# Patient Record
Sex: MALE | Race: WHITE | URBAN - METROPOLITAN AREA
[De-identification: names, ages, dates, MRNs, and addresses within clinical notes are randomized per-mention and may not be internally consistent; named-entity substitution may affect disease eponyms.]

---

## 2020-11-09 ENCOUNTER — OFFICE VISIT (OUTPATIENT)
Dept: URBAN - METROPOLITAN AREA CLINIC 19 | Facility: CLINIC | Age: 70
End: 2020-11-09

## 2020-11-10 ENCOUNTER — OFFICE VISIT (OUTPATIENT)
Dept: URBAN - METROPOLITAN AREA CLINIC 19 | Facility: CLINIC | Age: 70
End: 2020-11-10

## 2020-11-17 ENCOUNTER — OFFICE VISIT (OUTPATIENT)
Dept: URBAN - METROPOLITAN AREA CLINIC 19 | Facility: CLINIC | Age: 70
End: 2020-11-17
Payer: MEDICARE

## 2020-11-17 ENCOUNTER — WEB ENCOUNTER (OUTPATIENT)
Dept: URBAN - METROPOLITAN AREA CLINIC 19 | Facility: CLINIC | Age: 70
End: 2020-11-17

## 2020-11-17 DIAGNOSIS — R19.7 DIARRHEA: ICD-10-CM

## 2020-11-17 DIAGNOSIS — K59.03 DRUG-INDUCED CONSTIPATION: ICD-10-CM

## 2020-11-17 DIAGNOSIS — K86.89 PANCREATIC INSUFFICIENCY: ICD-10-CM

## 2020-11-17 PROBLEM — 21782001: Status: ACTIVE | Noted: 2020-11-17

## 2020-11-17 PROCEDURE — G8483 FLU IMM NO ADMIN DOC REA: HCPCS | Performed by: INTERNAL MEDICINE

## 2020-11-17 PROCEDURE — 99213 OFFICE O/P EST LOW 20 MIN: CPT | Performed by: INTERNAL MEDICINE

## 2020-11-17 PROCEDURE — 3017F COLORECTAL CA SCREEN DOC REV: CPT | Performed by: INTERNAL MEDICINE

## 2020-11-17 PROCEDURE — G8420 CALC BMI NORM PARAMETERS: HCPCS | Performed by: INTERNAL MEDICINE

## 2020-11-17 PROCEDURE — G8427 DOCREV CUR MEDS BY ELIG CLIN: HCPCS | Performed by: INTERNAL MEDICINE

## 2020-11-17 PROCEDURE — 1036F TOBACCO NON-USER: CPT | Performed by: INTERNAL MEDICINE

## 2020-11-17 PROCEDURE — G9903 PT SCRN TBCO ID AS NON USER: HCPCS | Performed by: INTERNAL MEDICINE

## 2020-11-17 RX ORDER — EZETIMIBE 10 MG/1
TAKE 1 TABLET (10 MG) BY ORAL ROUTE ONCE DAILY TABLET ORAL 1
Qty: 0 | Refills: 0 | Status: ACTIVE | COMMUNITY
Start: 1900-01-01

## 2020-11-17 RX ORDER — ASPIRIN 81 MG/1
TABLET, COATED ORAL
Qty: 0 | Refills: 0 | Status: ACTIVE | COMMUNITY
Start: 1900-01-01

## 2020-11-17 RX ORDER — LANSOPRAZOLE 30 MG/1
CAPSULE, DELAYED RELEASE ORAL
Qty: 0 | Refills: 0 | Status: ACTIVE | COMMUNITY
Start: 1900-01-01

## 2020-11-17 NOTE — HPI-TODAY'S VISIT:
1/15/19 (Dr. Naren Tabares):  This is a follow-up appointment for this patient, a 68 year old /White male, after a previous visit on 05/07/2019, for an evaluation for diarrhea. The patient now reports that the diarrhea has significantly improved. The patient has no other significant complaints. He has not traveled out of the country and has not been exposed to contaminated water. The patient does not consume significant amounts of lactose containing products.  Pt is using a Pancreatic enyme formula made by [employer] It is working well It is significantly cheaper Creon is very expensive for him.   5/7/19: Patient presents for the first time today to my clinic for reevaluation of his EPI. He is doing well on his supplement. His main concern is whether he may have pancreatic cancer. He stopped drinking alcohol back in December - he was a heavy drinker (more than 1 serving daily). He also had negative testing for SIBO, lactose intolerance, and sucrose intolerance.   11/8/19: Patient presents for reevaluation of his EPI. 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 Continues to take Creon. Found that stopping artificial sweetners helped his diarrhea/fecal soiling more than anything. Will consider rechecking fecal elastase to see if he truly has EPI. Continues to avoid alcohol.  11/17/20:  Patient presents for reevaluation of his chronic pancreatitis and possible EPI.  Still on pancreatic enzyme supplements and doing well.  Uses one Creon with dinner due to cost and uses his OTC pancreatic enzymes for breakfast and lunch.  Had recent bilateral knee replacements - was getting some constipation with lower abdominal cramping.  MiraLax helps a little.

## 2021-03-02 ENCOUNTER — OFFICE VISIT (OUTPATIENT)
Dept: URBAN - METROPOLITAN AREA CLINIC 19 | Facility: CLINIC | Age: 71
End: 2021-03-02
Payer: MEDICARE

## 2021-03-02 VITALS
BODY MASS INDEX: 26.71 KG/M2 | HEART RATE: 60 BPM | HEIGHT: 70 IN | SYSTOLIC BLOOD PRESSURE: 131 MMHG | WEIGHT: 186.6 LBS | DIASTOLIC BLOOD PRESSURE: 78 MMHG | TEMPERATURE: 96.9 F

## 2021-03-02 DIAGNOSIS — Z86.010 HISTORY OF COLON POLYPS: ICD-10-CM

## 2021-03-02 DIAGNOSIS — K86.89 PANCREATIC INSUFFICIENCY: ICD-10-CM

## 2021-03-02 PROBLEM — 428283002: Status: ACTIVE | Noted: 2021-03-02

## 2021-03-02 PROCEDURE — 99213 OFFICE O/P EST LOW 20 MIN: CPT | Performed by: INTERNAL MEDICINE

## 2021-03-02 RX ORDER — SODIUM, POTASSIUM,MAG SULFATES 17.5-3.13G
254 ML SOLUTION, RECONSTITUTED, ORAL ORAL ONCE
Qty: 1 KIT | Refills: 0 | OUTPATIENT
Start: 2021-03-02 | End: 2021-03-03

## 2021-03-02 RX ORDER — ASPIRIN 81 MG/1
TABLET, COATED ORAL
Qty: 0 | Refills: 0 | Status: ACTIVE | COMMUNITY
Start: 1900-01-01

## 2021-03-02 RX ORDER — LANSOPRAZOLE 30 MG/1
CAPSULE, DELAYED RELEASE ORAL
Qty: 0 | Refills: 0 | Status: ACTIVE | COMMUNITY
Start: 1900-01-01

## 2021-03-02 RX ORDER — EZETIMIBE 10 MG/1
TAKE 1 TABLET (10 MG) BY ORAL ROUTE ONCE DAILY TABLET ORAL 1
Qty: 0 | Refills: 0 | Status: ACTIVE | COMMUNITY
Start: 1900-01-01

## 2021-03-02 NOTE — HPI-TODAY'S VISIT:
1/15/19 (Dr. Naren Tabares):  This is a follow-up appointment for this patient, a 68 year old /White male, after a previous visit on 05/07/2019, for an evaluation for diarrhea. The patient now reports that the diarrhea has significantly improved. The patient has no other significant complaints. He has not traveled out of the country and has not been exposed to contaminated water. The patient does not consume significant amounts of lactose containing products.  Pt is using a Pancreatic enyme formula made by [employer] It is working well It is significantly cheaper Creon is very expensive for him.   5/7/19: Patient presents for the first time today to my clinic for reevaluation of his EPI. He is doing well on his supplement. His main concern is whether he may have pancreatic cancer. He stopped drinking alcohol back in December - he was a heavy drinker (more than 1 serving daily). He also had negative testing for SIBO, lactose intolerance, and sucrose intolerance.   11/8/19: Patient presents for reevaluation of his EPI. 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 Continues to take Creon. Found that stopping artificial sweetners helped his diarrhea/fecal soiling more than anything. Will consider rechecking fecal elastase to see if he truly has EPI. Continues to avoid alcohol.  11/17/20:  Patient presents for reevaluation of his chronic pancreatitis and possible EPI.  Still on pancreatic enzyme supplements and doing well.  Uses one Creon with dinner due to cost and uses his OTC pancreatic enzymes for breakfast and lunch.  Had recent bilateral knee replacements - was getting some constipation with lower abdominal cramping.  MiraLax helps a little.  3/3/21:  Patient returns for reevaluation of his exocrine pancreatic insufficiency.  He is doing well on Creon.  The majority of our conversation was focused on his recent MRI pancreas that showed "severe" fatty infiltration of the pancreas.  I explained to him that "severe" in this case has no clinical significance.  It merely describes that a large portion of the pancreas has been replaced by fat.  There were no masses, cysts, calcifications, duct dilation, etc.  As far as I know, there is no increased risk of malignancy with fatty infiltration, although EPI may have some association.  In either case, there is no clear guideline for screening for pancreatic cancer in this setting, which I emphasized to the patient.  Of note, his MRI from 5/28/19 also showed "complete fatty infiltration of the pancreas".  The only new finding on his recent MRI was fatty liver, but his LFTs are within normal limits.  He is, however, due for colon cancer screening.  His last colonoscopy was performed by Dr. Naren Tabares on 12/2/16.

## 2021-03-10 PROBLEM — 428283002 HISTORY OF POLYP OF COLON: Status: ACTIVE | Noted: 2021-03-10

## 2021-03-19 ENCOUNTER — OFFICE VISIT (OUTPATIENT)
Dept: URBAN - METROPOLITAN AREA CLINIC 19 | Facility: CLINIC | Age: 71
End: 2021-03-19

## 2021-03-22 ENCOUNTER — OFFICE VISIT (OUTPATIENT)
Dept: URBAN - METROPOLITAN AREA LAB 2 | Facility: LAB | Age: 71
End: 2021-03-22
Payer: MEDICARE

## 2021-03-22 DIAGNOSIS — Z86.010 H/O ADENOMATOUS POLYP OF COLON: ICD-10-CM

## 2021-03-22 PROCEDURE — G0105 COLORECTAL SCRN; HI RISK IND: HCPCS | Performed by: INTERNAL MEDICINE

## 2021-03-22 RX ORDER — LANSOPRAZOLE 30 MG/1
CAPSULE, DELAYED RELEASE ORAL
Qty: 0 | Refills: 0 | Status: ACTIVE | COMMUNITY
Start: 1900-01-01

## 2021-03-22 RX ORDER — EZETIMIBE 10 MG/1
TAKE 1 TABLET (10 MG) BY ORAL ROUTE ONCE DAILY TABLET ORAL 1
Qty: 0 | Refills: 0 | Status: ACTIVE | COMMUNITY
Start: 1900-01-01

## 2021-03-22 RX ORDER — ASPIRIN 81 MG/1
TABLET, COATED ORAL
Qty: 0 | Refills: 0 | Status: ACTIVE | COMMUNITY
Start: 1900-01-01

## 2021-11-19 ENCOUNTER — OFFICE VISIT (OUTPATIENT)
Dept: URBAN - METROPOLITAN AREA CLINIC 19 | Facility: CLINIC | Age: 71
End: 2021-11-19
Payer: MEDICARE

## 2021-11-19 ENCOUNTER — DASHBOARD ENCOUNTERS (OUTPATIENT)
Age: 71
End: 2021-11-19

## 2021-11-19 DIAGNOSIS — K86.89 PANCREATIC INSUFFICIENCY: ICD-10-CM

## 2021-11-19 DIAGNOSIS — Z86.010 PERSONAL HISTORY OF COLON POLYPS: ICD-10-CM

## 2021-11-19 PROBLEM — 428283002 HISTORY OF POLYP OF COLON: Status: ACTIVE | Noted: 2021-03-12

## 2021-11-19 PROBLEM — 773200006: Status: ACTIVE | Noted: 2021-03-02

## 2021-11-19 PROCEDURE — 99213 OFFICE O/P EST LOW 20 MIN: CPT | Performed by: INTERNAL MEDICINE

## 2021-11-19 RX ORDER — EZETIMIBE 10 MG/1
TAKE 1 TABLET (10 MG) BY ORAL ROUTE ONCE DAILY TABLET ORAL 1
Qty: 0 | Refills: 0 | Status: ACTIVE | COMMUNITY
Start: 1900-01-01

## 2021-11-19 RX ORDER — LANSOPRAZOLE 30 MG/1
CAPSULE, DELAYED RELEASE ORAL
Qty: 0 | Refills: 0 | Status: ACTIVE | COMMUNITY
Start: 1900-01-01

## 2021-11-19 RX ORDER — ASPIRIN 81 MG/1
TABLET, COATED ORAL
Qty: 0 | Refills: 0 | Status: ACTIVE | COMMUNITY
Start: 1900-01-01

## 2021-11-19 RX ORDER — PANCRELIPASE 36000; 180000; 114000 [USP'U]/1; [USP'U]/1; [USP'U]/1
AS DIRECTED CAPSULE, DELAYED RELEASE PELLETS ORAL
Qty: 90 | Refills: 3 | OUTPATIENT
Start: 2021-11-19 | End: 2022-11-14

## 2021-11-19 NOTE — HPI-TODAY'S VISIT:
1/15/19 (Dr. Naren Tabares):  This is a follow-up appointment for this patient, a 68 year old /White male, after a previous visit on 05/07/2019, for an evaluation for diarrhea. The patient now reports that the diarrhea has significantly improved. The patient has no other significant complaints. He has not traveled out of the country and has not been exposed to contaminated water. The patient does not consume significant amounts of lactose containing products.  Pt is using a Pancreatic enyme formula made by [employer] It is working well It is significantly cheaper Creon is very expensive for him.   5/7/19: Patient presents for the first time today to my clinic for reevaluation of his EPI. He is doing well on his supplement. His main concern is whether he may have pancreatic cancer. He stopped drinking alcohol back in December - he was a heavy drinker (more than 1 serving daily). He also had negative testing for SIBO, lactose intolerance, and sucrose intolerance.   11/8/19: Patient presents for reevaluation of his EPI. 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 Continues to take Creon. Found that stopping artificial sweetners helped his diarrhea/fecal soiling more than anything. Will consider rechecking fecal elastase to see if he truly has EPI. Continues to avoid alcohol.  11/17/20:  Patient presents for reevaluation of his chronic pancreatitis and possible EPI.  Still on pancreatic enzyme supplements and doing well.  Uses one Creon with dinner due to cost and uses his OTC pancreatic enzymes for breakfast and lunch.  Had recent bilateral knee replacements - was getting some constipation with lower abdominal cramping.  MiraLax helps a little.  3/3/21:  Patient returns for reevaluation of his exocrine pancreatic insufficiency.  He is doing well on Creon.  The majority of our conversation was focused on his recent MRI pancreas that showed "severe" fatty infiltration of the pancreas.  I explained to him that "severe" in this case has no clinical significance.  It merely describes that a large portion of the pancreas has been replaced by fat.  There were no masses, cysts, calcifications, duct dilation, etc.  As far as I know, there is no increased risk of malignancy with fatty infiltration, although EPI may have some association.  In either case, there is no clear guideline for screening for pancreatic cancer in this setting, which I emphasized to the patient.  Of note, his MRI from 5/28/19 also showed "complete fatty infiltration of the pancreas".  The only new finding on his recent MRI was fatty liver, but his LFTs are within normal limits.  He is, however, due for colon cancer screening.  His last colonoscopy was performed by Dr. Naren Tabares on 12/2/16.  11/19/21:  Patient presents today for reevaluation of his fatty pancreas/EPI.  I performed a screening colonoscopy on 3/22/21 that was normal.  He has no problems as long as he takes the one Creon 36,000 units lipase daily; I found out that he gets additional pancreatic enzymes over the counter that allow him to get more units of enzymes with each meal without extreme cost.  His only complaint today is that he sometimes feels that he does not completely evacuate with his bowel movement which sometimes leads to smearing of stool in his underwear.  He declined further testing, including anorectal manometry and MR defecorgraphy.

## 2021-11-24 ENCOUNTER — TELEPHONE ENCOUNTER (OUTPATIENT)
Dept: URBAN - METROPOLITAN AREA CLINIC 19 | Facility: CLINIC | Age: 71
End: 2021-11-24

## 2022-11-15 ENCOUNTER — OFFICE VISIT (OUTPATIENT)
Dept: URBAN - METROPOLITAN AREA CLINIC 19 | Facility: CLINIC | Age: 72
End: 2022-11-15